# Patient Record
Sex: MALE | Race: WHITE
[De-identification: names, ages, dates, MRNs, and addresses within clinical notes are randomized per-mention and may not be internally consistent; named-entity substitution may affect disease eponyms.]

---

## 2018-02-20 ENCOUNTER — HOSPITAL ENCOUNTER (EMERGENCY)
Dept: HOSPITAL 7 - FB.ED | Age: 83
Discharge: HOME | End: 2018-02-20
Payer: MEDICARE

## 2018-02-20 VITALS — DIASTOLIC BLOOD PRESSURE: 83 MMHG | SYSTOLIC BLOOD PRESSURE: 112 MMHG

## 2018-02-20 DIAGNOSIS — D64.9: ICD-10-CM

## 2018-02-20 DIAGNOSIS — E87.1: Primary | ICD-10-CM

## 2018-02-20 DIAGNOSIS — E87.70: ICD-10-CM

## 2018-02-20 PROCEDURE — 83735 ASSAY OF MAGNESIUM: CPT

## 2018-02-20 PROCEDURE — 36415 COLL VENOUS BLD VENIPUNCTURE: CPT

## 2018-02-20 PROCEDURE — 99284 EMERGENCY DEPT VISIT MOD MDM: CPT

## 2018-02-20 PROCEDURE — 96374 THER/PROPH/DIAG INJ IV PUSH: CPT

## 2018-02-20 PROCEDURE — 85027 COMPLETE CBC AUTOMATED: CPT

## 2018-02-20 PROCEDURE — 80048 BASIC METABOLIC PNL TOTAL CA: CPT

## 2018-02-20 NOTE — EDM.PDOC
ED HPI GENERAL MEDICAL PROBLEM





- General


Chief Complaint: General


Stated Complaint: SOB


Time Seen by Provider: 02/20/18 11:30


Source of Information: Reports: Patient, Old Records


History Limitations: Reports: No Limitations





- History of Present Illness


INITIAL COMMENTS - FREE TEXT/NARRATIVE: 





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 

coronary artery with post op weakness making him unable to return home yet. He 

has a hx of LE edema and CHF. Is more SOB and has more leg swelling lately. No 

chest pain or black/bloody stools. Is from [place] and his primary is 

there. Sits with his legs down most of the day. Due to hip pain he is unable to 

elevate the legs. Does have worse SOB with lying. Has oxygen at low flow at the 

[facility]. 





Hgb 2/8/18 was 8.3


Onset: Gradual


Onset Date: 02/18/18


Duration: Day(s):, Getting Worse


Location: Reports: Chest, Lower Extremity, Left, Lower Extremity, Right


Quality: Reports: Other (no pain)


Severity: Moderate


Improves with: Reports: None


Worsens with: Reports: Other (? time)


Context: Reports: Other (Hx of CHF and bilateral LE edema)


Associated Symptoms: Reports: Shortness of Breath, Weakness.  Denies: Chest Pain

, Cough, Diaphoresis, Fever/Chills


Treatments PTA: Reports: Other (see below) (usual meds)





- Related Data


 Allergies











Allergy/AdvReac Type Severity Reaction Status Date / Time


 


No Known Allergies Allergy   Verified 02/20/18 11:49











Home Meds: 


 Home Meds





Lisinopril [Lisinopril] 5 mg PO BID 07/05/15 [History]


Metoprolol Tartrate [Lopressor] 50 mg PO BID 07/05/15 [History]


Spironolactone [Spironolactone] 25 mg PO BID 07/05/15 [History]


Furosemide 40 mg PO Q48H #7 tablet 02/20/18 [Rx]











Past Medical History


Other Genitourinary History: bladder ca





Social & Family History





- Tobacco Use


Smoking Status *Q: Never Smoker


Second Hand Smoke Exposure: No





- Recreational Drug Use


Recreational Drug Use: No





ED ROS GENERAL





- Review of Systems


Review Of Systems: See Below


Constitutional: Reports: Weakness


HEENT: Reports: No Symptoms


Respiratory: Reports: Shortness of Breath, Other (orthopnea).  Denies: Wheezing

, Cough, Sputum, Hemoptysis


Cardiovascular: Reports: No Symptoms


GI/Abdominal: Reports: No Symptoms


: Reports: No Symptoms


Musculoskeletal: Reports: No Symptoms


Skin: Reports: Other (both LE's weeping, R > L)


Neurological: Reports: No Symptoms


Psychiatric: Reports: No Symptoms





ED EXAM, GENERAL





- Physical Exam


Exam: See Below


Exam Limited By: No Limitations


General Appearance: Alert, WD/WN, No Apparent Distress


Eye Exam: Bilateral Eye: PERRL, Other (pale conjunctivas)


Ears: Normal External Exam, Normal Canal, Hearing Grossly Normal


Ear Exam: Bilateral Ear: Auricle Normal, Canal Normal


Nose: Normal Inspection, Normal Mucosa


Throat/Mouth: Normal Inspection, Normal Lips, Normal Oropharynx, Normal Voice, 

No Airway Compromise


Head: Atraumatic, Normocephalic


Neck: Normal Inspection, Supple, Non-Tender


Respiratory/Chest: No Respiratory Distress, No Accessory Muscle Use, Rales (at 

both bases)


Cardiovascular: Regular Rate, Rhythm


GI/Abdominal: Normal Bowel Sounds, Soft, Non-Tender, No Distention


Back Exam: Normal Inspection.  No: CVA Tenderness (R), CVA Tenderness (L)


Extremities: Pedal Edema (anasarca of both LE's below the knees with skin 

weeping present.).  No: No Pedal Edema, Kirsten's Sign


Neurological: Alert, Oriented, CN II-XII Intact, Normal Cognition, No Motor/

Sensory Deficits


Psychiatric: Normal Affect, Normal Mood


Skin Exam: Warm, No Rash, Other (weeping of skin below the knees, R>L)





Course





- Vital Signs


Last Recorded V/S: 


 Last Vital Signs











Temp  36.2 C   02/20/18 11:15


 


Pulse  72   02/20/18 11:15


 


Resp  21 H  02/20/18 11:15


 


BP  117/94 H  02/20/18 11:15


 


Pulse Ox  88 L  02/20/18 11:15














- Orders/Labs/Meds


Orders: 


 Active Orders 24 hr











 Category Date Time Status


 


 Sodium Chloride 0.9% [Saline Flush] Med  02/20/18 11:33 Active





 10 ml FLUSH ASDIRECTED PRN   


 


 Saline Lock Insert [OM.PC] Routine Oth  02/20/18 11:33 Ordered








 Medication Orders





Sodium Chloride (Saline Flush)  10 ml FLUSH ASDIRECTED PRN


   PRN Reason: Keep Vein Open








Labs: 


 Laboratory Tests











  02/20/18 02/20/18 02/20/18 Range/Units





  11:11 11:11 11:50 


 


WBC  5.1    (4.5-12.0)  X10-3/uL


 


RBC  2.54 L    (4.30-5.75)  x10(6)uL


 


Hgb  7.8 L    (11.5-15.5)  g/dL


 


Hct  23.4 L    (30.0-51.3)  %


 


MCV  92.1    (80-96)  fL


 


MCH  30.6    (27.7-33.6)  pg


 


MCHC  33.2    (32.2-35.4)  g/dL


 


RDW  15.4    (11.5-15.5)  %


 


Plt Count  157    (125-369)  X10(3)uL


 


Sodium    131 L  (135-145)  mmol/L


 


Potassium    5.0  (3.5-5.3)  mmol/L


 


Chloride    95 L  (100-110)  mmol/L


 


Carbon Dioxide    28  (21-32)  mmol/L


 


BUN    20 H  (7-18)  mg/dL


 


Creatinine    0.7  (0.70-1.30)  mg/dL


 


Est Cr Clr Drug Dosing    TNP  


 


Estimated GFR (MDRD)    > 60  (>60)  


 


BUN/Creatinine Ratio    28.6 H  (9-20)  


 


Glucose    107  ()  mg/dL


 


Calcium    9.0  (8.6-10.2)  mg/dL


 


Magnesium   2.0   (1.8-2.5)  mg/dL











Meds: 


Medications











Generic Name Dose Route Start Last Admin





  Trade Name Freq  PRN Reason Stop Dose Admin


 


Sodium Chloride  10 ml  02/20/18 11:33  





  Saline Flush  FLUSH   





  ASDIRECTED PRN   





  Keep Vein Open   














Discontinued Medications














Generic Name Dose Route Start Last Admin





  Trade Name Freq  PRN Reason Stop Dose Admin


 


Furosemide  80 mg  02/20/18 11:33  02/20/18 11:49





  Lasix  IVPUSH  02/20/18 11:34  80 mg





  NOW ONE   Administration














Departure





- Departure


Time of Disposition: 13:40


Disposition: Home, Self-Care 01


Condition: Fair


Clinical Impression: 


 Hyponatremia





Fluid overload


Qualifiers:


 Hypervolemia type: unspecified Qualified Code(s): E87.70 - Fluid overload, 

unspecified





Anemia


Qualifiers:


 Anemia type: unspecified type Qualified Code(s): D64.9 - Anemia, unspecified








- Discharge Information


Prescriptions: 


Furosemide 40 mg PO Q48H #7 tablet


Referrals: 


PCP,Not In Area [Primary Care Provider] - 


Forms:  ED Department Discharge





- My Orders


Last 24 Hours: 


My Active Orders





02/20/18 11:33


Sodium Chloride 0.9% [Saline Flush]   10 ml FLUSH ASDIRECTED PRN 


Saline Lock Insert [OM.PC] Routine 














- Assessment/Plan


Last 24 Hours: 


My Active Orders





02/20/18 11:33


Sodium Chloride 0.9% [Saline Flush]   10 ml FLUSH ASDIRECTED PRN 


Saline Lock Insert [OM.PC] Routine